# Patient Record
Sex: FEMALE | NOT HISPANIC OR LATINO | URBAN - METROPOLITAN AREA
[De-identification: names, ages, dates, MRNs, and addresses within clinical notes are randomized per-mention and may not be internally consistent; named-entity substitution may affect disease eponyms.]

---

## 2019-01-02 ENCOUNTER — IMPORTED ENCOUNTER (OUTPATIENT)
Dept: URBAN - METROPOLITAN AREA CLINIC 1 | Facility: CLINIC | Age: 39
End: 2019-01-02

## 2019-01-02 PROBLEM — H52.13: Noted: 2019-01-02

## 2019-01-02 PROBLEM — H52.4: Noted: 2019-01-02

## 2019-01-02 PROCEDURE — S0620 ROUTINE OPHTHALMOLOGICAL EXA: HCPCS

## 2019-01-02 NOTE — PATIENT DISCUSSION
1. Myopia OU -- Finalized Glasses MRx was given to patient today for correction if indicated and requested. 2. Presbyopia OU Return for an appointment in 1 YR for a 40 OU with Dr. Clarisa Ashley.

## 2019-04-15 NOTE — PATIENT DISCUSSION
The patient was informed that with [address] [facility] for distance, they will need glasses for all near and intermediate activities after surgery. The patient understands there is a possibility they may need an enhancement after surgery. The patient elects [employer] OD, goal of emmetropia.

## 2019-04-16 NOTE — PATIENT DISCUSSION
The patient was informed that with [address] [facility] for distance, they will need glasses for all near and intermediate activities after surgery. The patient understands there is a possibility they may need an enhancement after surgery. The patient elects [employer] OD, goal of emmetropia.

## 2019-04-23 NOTE — PATIENT DISCUSSION
The patient was informed that with [address] [facility] for distance, they will need glasses for all near and intermediate activities after surgery. The patient understands there is a possibility they may need an enhancement after surgery. The patient elects [employer] OD, goal of emmetropia.

## 2019-04-23 NOTE — PATIENT DISCUSSION
The patient was informed that with [address] [facility] for distance, they will need glasses for all near and intermediate activities after surgery. The patient understands there is a possibility they may need an enhancement after surgery. The patient elects [employer] OD, goal of emmetropia.

## 2019-04-24 NOTE — PATIENT DISCUSSION
Cataract surgery has been performed in the first eye and activities of daily living are still impaired. The patient would like to proceed with cataract surgery in the second eye as scheduled. The patient elects [employer] OS, goal of [place].

## 2019-10-29 NOTE — PROCEDURE NOTE: CLINICAL
PROCEDURE NOTE: Punctal Plugs, Silicone #1 OU. Diagnosis: Dry Eye Syndrome. Anesthesia: Topical. Prep: Antibiotic Drops q 5min x 3. Prior to treatment, the risks/benefits/alternatives were discussed. The patient wished to proceed with procedure. Permanent silicone plugs were inserted. Patient tolerated procedure well. There were no complications. Post procedure instructions given. Size Oasis soft plug RLL 0.8 mm, LLL:  0.7 mm. Rosario Massac

## 2019-11-05 NOTE — PROCEDURE NOTE: SURGICAL
"<p>Prior to commencing surgery patient identification, surgical procedure, site, and side were confirmed by Dr. Georges.&nbsp; Following topical proparacaine anesthesia, the patient was positioned at the YAG laser, a contact lens coupled to the cornea of the right eye with methylcellulose and an axial posterior capsulotomy performed without complication using 3.8 Mj x 13. Attention was then turned to the left eye and a contact lens coupled to the cornea of the left eye with methylcellulose and an axial posterior capsulotomy performed without complication using 3.8 Mj x 14. One drop of Alphagan was instilled in both eyes and the patient returned to the holding area having tolerated the procedure well and without complication. </p><p><span style=""font-size:12px;"">MRN:[id number]</span><br /></p>"

## 2019-12-11 NOTE — PATIENT DISCUSSION
Patient reassured she is doing well and her vision is improving.  Patient informed that refractive error is to small for LVC at this time.  Recommend more time to heal and improve.

## 2020-03-11 NOTE — PATIENT DISCUSSION
Patient reassured she is doing well and her vision is improving.  Patient informed that refractive error maybe to small for LVC at this time.  She may not note an appreciable difference with LVC.

## 2020-03-11 NOTE — PATIENT DISCUSSION
Patient reassured she is doing well and her vision is improving.  Patient informed that refractive error is to small for LVC at this time.

## 2021-06-18 NOTE — PATIENT DISCUSSION
Patient made aware of 24/7 emergency services. <-- Click to add NO significant Past Surgical History

## 2022-04-02 ASSESSMENT — VISUAL ACUITY
OS_CC: 20/25-1
OD_CC: 20/25-1
OD_SC: J1+
OS_SC: J1+

## 2022-04-02 ASSESSMENT — TONOMETRY
OD_IOP_MMHG: 15
OS_IOP_MMHG: 14
